# Patient Record
Sex: FEMALE | Race: OTHER | NOT HISPANIC OR LATINO
[De-identification: names, ages, dates, MRNs, and addresses within clinical notes are randomized per-mention and may not be internally consistent; named-entity substitution may affect disease eponyms.]

---

## 2023-07-18 PROBLEM — Z00.00 ENCOUNTER FOR PREVENTIVE HEALTH EXAMINATION: Status: ACTIVE | Noted: 2023-07-18

## 2023-08-02 ENCOUNTER — APPOINTMENT (OUTPATIENT)
Dept: OBGYN | Facility: CLINIC | Age: 33
End: 2023-08-02
Payer: MEDICAID

## 2023-08-02 ENCOUNTER — TRANSCRIPTION ENCOUNTER (OUTPATIENT)
Age: 33
End: 2023-08-02

## 2023-08-02 VITALS
SYSTOLIC BLOOD PRESSURE: 104 MMHG | DIASTOLIC BLOOD PRESSURE: 74 MMHG | OXYGEN SATURATION: 100 % | WEIGHT: 170 LBS | TEMPERATURE: 98.2 F | HEIGHT: 65 IN | HEART RATE: 65 BPM | RESPIRATION RATE: 16 BRPM | BODY MASS INDEX: 28.32 KG/M2

## 2023-08-02 DIAGNOSIS — Z01.419 ENCOUNTER FOR GYNECOLOGICAL EXAMINATION (GENERAL) (ROUTINE) W/OUT ABNORMAL FINDINGS: ICD-10-CM

## 2023-08-02 DIAGNOSIS — N92.6 IRREGULAR MENSTRUATION, UNSPECIFIED: ICD-10-CM

## 2023-08-02 PROCEDURE — 99385 PREV VISIT NEW AGE 18-39: CPT

## 2023-08-02 PROCEDURE — 36415 COLL VENOUS BLD VENIPUNCTURE: CPT

## 2023-08-02 NOTE — HISTORY OF PRESENT ILLNESS
[FreeTextEntry1] : Juliet Hebert 31 YO, presents for Annual G 0    LMP: 07/19/2023 - Irregular Menses Sexually active, with the same partner X 1 year No Medication Maternal aunt has history of breast Cancer.  To do monthly SBE.  Trying to conceive without timing it.  Partner has a daughter with another women.  Has gained over 20 lbs. in last 1 year.  No health issues, nonsmoker.  To f/u after pelvic sonogram.

## 2023-08-03 LAB
C TRACH RRNA SPEC QL NAA+PROBE: NOT DETECTED
DHEA-S SERPL-MCNC: 338 UG/DL
ESTIMATED AVERAGE GLUCOSE: 114 MG/DL
ESTRADIOL SERPL-MCNC: 79 PG/ML
FSH SERPL-MCNC: 5.4 IU/L
HBA1C MFR BLD HPLC: 5.6 %
HCG SERPL-MCNC: <1 MIU/ML
LH SERPL-ACNC: 11.9 IU/L
N GONORRHOEA RRNA SPEC QL NAA+PROBE: NOT DETECTED
PROLACTIN SERPL-MCNC: 10.5 NG/ML
SOURCE TP AMPLIFICATION: NORMAL
TESTOST SERPL-MCNC: 57.5 NG/DL

## 2023-08-05 LAB — CYTOLOGY CVX/VAG DOC THIN PREP: NORMAL

## 2023-09-21 ENCOUNTER — APPOINTMENT (OUTPATIENT)
Dept: OBGYN | Facility: CLINIC | Age: 33
End: 2023-09-21
Payer: MEDICAID

## 2023-09-21 VITALS
SYSTOLIC BLOOD PRESSURE: 105 MMHG | WEIGHT: 170 LBS | RESPIRATION RATE: 16 BRPM | OXYGEN SATURATION: 97 % | TEMPERATURE: 98 F | DIASTOLIC BLOOD PRESSURE: 74 MMHG | HEART RATE: 64 BPM | BODY MASS INDEX: 28.32 KG/M2 | HEIGHT: 65 IN

## 2023-09-21 DIAGNOSIS — E28.2 POLYCYSTIC OVARIAN SYNDROME: ICD-10-CM

## 2023-09-21 PROCEDURE — 99213 OFFICE O/P EST LOW 20 MIN: CPT

## 2024-06-01 ENCOUNTER — INPATIENT (INPATIENT)
Facility: HOSPITAL | Age: 34
LOS: 0 days | Discharge: HOME | End: 2024-06-02
Attending: STUDENT IN AN ORGANIZED HEALTH CARE EDUCATION/TRAINING PROGRAM | Admitting: STUDENT IN AN ORGANIZED HEALTH CARE EDUCATION/TRAINING PROGRAM
Payer: COMMERCIAL

## 2024-06-01 VITALS
OXYGEN SATURATION: 99 % | HEIGHT: 64 IN | DIASTOLIC BLOOD PRESSURE: 66 MMHG | RESPIRATION RATE: 18 BRPM | HEART RATE: 132 BPM | WEIGHT: 166.89 LBS | SYSTOLIC BLOOD PRESSURE: 11 MMHG | TEMPERATURE: 99 F

## 2024-06-01 DIAGNOSIS — D72.825 BANDEMIA: ICD-10-CM

## 2024-06-01 DIAGNOSIS — Z29.9 ENCOUNTER FOR PROPHYLACTIC MEASURES, UNSPECIFIED: ICD-10-CM

## 2024-06-01 DIAGNOSIS — J02.9 ACUTE PHARYNGITIS, UNSPECIFIED: ICD-10-CM

## 2024-06-01 LAB
ALBUMIN SERPL ELPH-MCNC: 4 G/DL — SIGNIFICANT CHANGE UP (ref 3.3–5)
ALP SERPL-CCNC: 94 U/L — SIGNIFICANT CHANGE UP (ref 40–120)
ALT FLD-CCNC: 40 U/L — HIGH (ref 4–33)
ANION GAP SERPL CALC-SCNC: 13 MMOL/L — SIGNIFICANT CHANGE UP (ref 7–14)
AST SERPL-CCNC: 41 U/L — HIGH (ref 4–32)
BASE EXCESS BLDV CALC-SCNC: -1.5 MMOL/L — SIGNIFICANT CHANGE UP (ref -2–3)
BASOPHILS # BLD AUTO: 0 K/UL — SIGNIFICANT CHANGE UP (ref 0–0.2)
BASOPHILS NFR BLD AUTO: 0 % — SIGNIFICANT CHANGE UP (ref 0–2)
BILIRUB SERPL-MCNC: 0.6 MG/DL — SIGNIFICANT CHANGE UP (ref 0.2–1.2)
BLOOD GAS VENOUS COMPREHENSIVE RESULT: SIGNIFICANT CHANGE UP
BUN SERPL-MCNC: 10 MG/DL — SIGNIFICANT CHANGE UP (ref 7–23)
CALCIUM SERPL-MCNC: 8.6 MG/DL — SIGNIFICANT CHANGE UP (ref 8.4–10.5)
CHLORIDE BLDV-SCNC: 102 MMOL/L — SIGNIFICANT CHANGE UP (ref 96–108)
CHLORIDE SERPL-SCNC: 102 MMOL/L — SIGNIFICANT CHANGE UP (ref 98–107)
CO2 BLDV-SCNC: 25.7 MMOL/L — SIGNIFICANT CHANGE UP (ref 22–26)
CO2 SERPL-SCNC: 21 MMOL/L — LOW (ref 22–31)
CREAT SERPL-MCNC: 0.97 MG/DL — SIGNIFICANT CHANGE UP (ref 0.5–1.3)
EGFR: 80 ML/MIN/1.73M2 — SIGNIFICANT CHANGE UP
EOSINOPHIL # BLD AUTO: 0.17 K/UL — SIGNIFICANT CHANGE UP (ref 0–0.5)
EOSINOPHIL NFR BLD AUTO: 1.8 % — SIGNIFICANT CHANGE UP (ref 0–6)
FLUAV AG NPH QL: SIGNIFICANT CHANGE UP
FLUBV AG NPH QL: SIGNIFICANT CHANGE UP
GAS PNL BLDV: 133 MMOL/L — LOW (ref 136–145)
GAS PNL BLDV: SIGNIFICANT CHANGE UP
GLUCOSE BLDV-MCNC: 107 MG/DL — HIGH (ref 70–99)
GLUCOSE SERPL-MCNC: 110 MG/DL — HIGH (ref 70–99)
HCG SERPL-ACNC: <1 MIU/ML — SIGNIFICANT CHANGE UP
HCO3 BLDV-SCNC: 24 MMOL/L — SIGNIFICANT CHANGE UP (ref 22–29)
HCT VFR BLD CALC: 40.8 % — SIGNIFICANT CHANGE UP (ref 34.5–45)
HCT VFR BLDA CALC: 41 % — SIGNIFICANT CHANGE UP (ref 34.5–46.5)
HGB BLD CALC-MCNC: 13.6 G/DL — SIGNIFICANT CHANGE UP (ref 11.7–16.1)
HGB BLD-MCNC: 13.1 G/DL — SIGNIFICANT CHANGE UP (ref 11.5–15.5)
IANC: 7.56 K/UL — HIGH (ref 1.8–7.4)
LACTATE BLDV-MCNC: 1.3 MMOL/L — SIGNIFICANT CHANGE UP (ref 0.5–2)
LYMPHOCYTES # BLD AUTO: 0.33 K/UL — LOW (ref 1–3.3)
LYMPHOCYTES # BLD AUTO: 3.5 % — LOW (ref 13–44)
MANUAL SMEAR VERIFICATION: SIGNIFICANT CHANGE UP
MCHC RBC-ENTMCNC: 26.6 PG — LOW (ref 27–34)
MCHC RBC-ENTMCNC: 32.1 GM/DL — SIGNIFICANT CHANGE UP (ref 32–36)
MCV RBC AUTO: 82.9 FL — SIGNIFICANT CHANGE UP (ref 80–100)
METAMYELOCYTES # FLD: 4.4 % — HIGH (ref 0–1)
MONOCYTES # BLD AUTO: 0.91 K/UL — HIGH (ref 0–0.9)
MONOCYTES NFR BLD AUTO: 9.7 % — SIGNIFICANT CHANGE UP (ref 2–14)
NEUTROPHILS # BLD AUTO: 7.35 K/UL — SIGNIFICANT CHANGE UP (ref 1.8–7.4)
NEUTROPHILS NFR BLD AUTO: 38.1 % — LOW (ref 43–77)
NEUTS BAND # BLD: 39.8 % — CRITICAL HIGH (ref 0–6)
PCO2 BLDV: 44 MMHG — SIGNIFICANT CHANGE UP (ref 39–52)
PH BLDV: 7.35 — SIGNIFICANT CHANGE UP (ref 7.32–7.43)
PLAT MORPH BLD: ABNORMAL
PLATELET # BLD AUTO: 300 K/UL — SIGNIFICANT CHANGE UP (ref 150–400)
PLATELET COUNT - ESTIMATE: NORMAL — SIGNIFICANT CHANGE UP
PO2 BLDV: 24 MMHG — LOW (ref 25–45)
POLYCHROMASIA BLD QL SMEAR: SIGNIFICANT CHANGE UP
POTASSIUM BLDV-SCNC: 3.9 MMOL/L — SIGNIFICANT CHANGE UP (ref 3.5–5.1)
POTASSIUM SERPL-MCNC: 3.8 MMOL/L — SIGNIFICANT CHANGE UP (ref 3.5–5.3)
POTASSIUM SERPL-SCNC: 3.8 MMOL/L — SIGNIFICANT CHANGE UP (ref 3.5–5.3)
PROT SERPL-MCNC: 7.6 G/DL — SIGNIFICANT CHANGE UP (ref 6–8.3)
RBC # BLD: 4.92 M/UL — SIGNIFICANT CHANGE UP (ref 3.8–5.2)
RBC # FLD: 13 % — SIGNIFICANT CHANGE UP (ref 10.3–14.5)
RBC BLD AUTO: ABNORMAL
RSV RNA NPH QL NAA+NON-PROBE: SIGNIFICANT CHANGE UP
S PYO DNA THROAT QL NAA+PROBE: ABNORMAL
SAO2 % BLDV: 40 % — LOW (ref 67–88)
SARS-COV-2 RNA SPEC QL NAA+PROBE: SIGNIFICANT CHANGE UP
SODIUM SERPL-SCNC: 136 MMOL/L — SIGNIFICANT CHANGE UP (ref 135–145)
VARIANT LYMPHS # BLD: 2.7 % — SIGNIFICANT CHANGE UP (ref 0–6)
WBC # BLD: 9.43 K/UL — SIGNIFICANT CHANGE UP (ref 3.8–10.5)
WBC # FLD AUTO: 9.43 K/UL — SIGNIFICANT CHANGE UP (ref 3.8–10.5)

## 2024-06-01 PROCEDURE — 99222 1ST HOSP IP/OBS MODERATE 55: CPT

## 2024-06-01 PROCEDURE — 71046 X-RAY EXAM CHEST 2 VIEWS: CPT | Mod: 26

## 2024-06-01 PROCEDURE — 70491 CT SOFT TISSUE NECK W/DYE: CPT | Mod: 26,MC

## 2024-06-01 PROCEDURE — 99285 EMERGENCY DEPT VISIT HI MDM: CPT

## 2024-06-01 RX ORDER — BENZOCAINE AND MENTHOL 5; 1 G/100ML; G/100ML
1 LIQUID ORAL
Refills: 0 | Status: DISCONTINUED | OUTPATIENT
Start: 2024-06-01 | End: 2024-06-02

## 2024-06-01 RX ORDER — ONDANSETRON 8 MG/1
4 TABLET, FILM COATED ORAL EVERY 8 HOURS
Refills: 0 | Status: DISCONTINUED | OUTPATIENT
Start: 2024-06-01 | End: 2024-06-02

## 2024-06-01 RX ORDER — ASCORBIC ACID 60 MG
1 TABLET,CHEWABLE ORAL
Refills: 0 | DISCHARGE

## 2024-06-01 RX ORDER — DEXAMETHASONE 0.5 MG/5ML
10 ELIXIR ORAL ONCE
Refills: 0 | Status: COMPLETED | OUTPATIENT
Start: 2024-06-01 | End: 2024-06-01

## 2024-06-01 RX ORDER — PENICILLIN V POTASIUM 500 MG/1
500 TABLET OROPHARYNGEAL EVERY 8 HOURS
Refills: 0 | Status: DISCONTINUED | OUTPATIENT
Start: 2024-06-01 | End: 2024-06-02

## 2024-06-01 RX ORDER — SODIUM CHLORIDE 9 MG/ML
1000 INJECTION INTRAMUSCULAR; INTRAVENOUS; SUBCUTANEOUS ONCE
Refills: 0 | Status: COMPLETED | OUTPATIENT
Start: 2024-06-01 | End: 2024-06-01

## 2024-06-01 RX ORDER — KETOROLAC TROMETHAMINE 30 MG/ML
15 SYRINGE (ML) INJECTION ONCE
Refills: 0 | Status: DISCONTINUED | OUTPATIENT
Start: 2024-06-01 | End: 2024-06-01

## 2024-06-01 RX ORDER — ACETAMINOPHEN 500 MG
650 TABLET ORAL EVERY 6 HOURS
Refills: 0 | Status: DISCONTINUED | OUTPATIENT
Start: 2024-06-01 | End: 2024-06-02

## 2024-06-01 RX ORDER — PIPERACILLIN AND TAZOBACTAM 4; .5 G/20ML; G/20ML
3.38 INJECTION, POWDER, LYOPHILIZED, FOR SOLUTION INTRAVENOUS ONCE
Refills: 0 | Status: COMPLETED | OUTPATIENT
Start: 2024-06-01 | End: 2024-06-01

## 2024-06-01 RX ORDER — LANOLIN ALCOHOL/MO/W.PET/CERES
3 CREAM (GRAM) TOPICAL AT BEDTIME
Refills: 0 | Status: DISCONTINUED | OUTPATIENT
Start: 2024-06-01 | End: 2024-06-02

## 2024-06-01 RX ORDER — KETOROLAC TROMETHAMINE 30 MG/ML
15 SYRINGE (ML) INJECTION EVERY 6 HOURS
Refills: 0 | Status: DISCONTINUED | OUTPATIENT
Start: 2024-06-01 | End: 2024-06-02

## 2024-06-01 RX ADMIN — PENICILLIN V POTASIUM 500 MILLIGRAM(S): 500 TABLET OROPHARYNGEAL at 23:18

## 2024-06-01 RX ADMIN — Medication 15 MILLIGRAM(S): at 09:26

## 2024-06-01 RX ADMIN — Medication 10 MILLIGRAM(S): at 12:24

## 2024-06-01 RX ADMIN — Medication 15 MILLIGRAM(S): at 12:23

## 2024-06-01 RX ADMIN — Medication 15 MILLIGRAM(S): at 23:18

## 2024-06-01 RX ADMIN — SODIUM CHLORIDE 1000 MILLILITER(S): 9 INJECTION INTRAMUSCULAR; INTRAVENOUS; SUBCUTANEOUS at 09:26

## 2024-06-01 RX ADMIN — Medication 15 MILLIGRAM(S): at 18:42

## 2024-06-01 RX ADMIN — SODIUM CHLORIDE 1000 MILLILITER(S): 9 INJECTION INTRAMUSCULAR; INTRAVENOUS; SUBCUTANEOUS at 12:23

## 2024-06-01 RX ADMIN — SODIUM CHLORIDE 1000 MILLILITER(S): 9 INJECTION INTRAMUSCULAR; INTRAVENOUS; SUBCUTANEOUS at 11:58

## 2024-06-01 RX ADMIN — Medication 15 MILLIGRAM(S): at 18:29

## 2024-06-01 RX ADMIN — SODIUM CHLORIDE 1000 MILLILITER(S): 9 INJECTION INTRAMUSCULAR; INTRAVENOUS; SUBCUTANEOUS at 12:58

## 2024-06-01 RX ADMIN — PIPERACILLIN AND TAZOBACTAM 200 GRAM(S): 4; .5 INJECTION, POWDER, LYOPHILIZED, FOR SOLUTION INTRAVENOUS at 12:21

## 2024-06-01 RX ADMIN — Medication 102 MILLIGRAM(S): at 09:48

## 2024-06-01 RX ADMIN — Medication 1 TABLET(S): at 11:55

## 2024-06-01 NOTE — H&P ADULT - PROBLEM SELECTOR PLAN 1
Sepsis present on admission (HR >100 w/ bandemia on 39%), reports subjective fever at home.  CT neck with pharyngitis, most notable at the left palatine tonsil but without a rim-enhancing or organized collection to indicate abscess. Reactive jugular chain lymphadenopathy, also without abscess at this time.  - check COVID swab  - Centor score 3, strep pcr in lab  - s/p Augmentin and zosyn, will c/w pcn PO  - s/p decadron 10 mg in ED, s/p toradol, will c/w IV toradol for pain  - f/u blood cultures  - c/w supportive care, monitor PO tolerance and neck/airway--currently appears comfortable Sepsis present on admission (HR >100 w/ bandemia on 39%), reports subjective fever at home.  CT neck with pharyngitis, most notable at the left palatine tonsil but without a rim-enhancing or organized collection to indicate abscess. Reactive jugular chain lymphadenopathy, also without abscess at this time.  - check COVID swab  - declined STI testing   - Centor score 3, strep pcr in lab  - s/p Augmentin and zosyn, will c/w pcn PO to cover GAS for now  - s/p decadron 10 mg in ED, s/p Toradol, will c/w IV Toradol for pain  - f/u blood cultures  - c/w supportive care, monitor PO tolerance and neck/airway--currently appears comfortable  - soft diet, advance as tolerared

## 2024-06-01 NOTE — H&P ADULT - ASSESSMENT
33F no medical history who presents with worsening sore throat and inability to swallow admitted for sepsis due to bacterial pharyngitis and airway monitoring.

## 2024-06-01 NOTE — PATIENT PROFILE ADULT - FALL HARM RISK - UNIVERSAL INTERVENTIONS
Bed in lowest position, wheels locked, appropriate side rails in place/Call bell, personal items and telephone in reach/Instruct patient to call for assistance before getting out of bed or chair/Non-slip footwear when patient is out of bed/[place] to call system/Physically safe environment - no spills, clutter or unnecessary equipment/Purposeful Proactive Rounding/Room/bathroom lighting operational, light cord in reach

## 2024-06-01 NOTE — ED PROVIDER NOTE - PHYSICAL EXAMINATION
GENERAL: Awake, alert, NAD  HEENT: Muffled voice, significant erythema and edema to posterior pharynx, left tonsil with white exudates, patient spitting up secretions in setting of pain.  LUNGS: CTAB, no wheezes or crackles   CARDIAC: RRR, no m/r/g  ABDOMEN: Soft, non tender  EXT: No edema, no calf tenderness, no deformities.  NEURO: A&Ox3. Moving all extremities.  SKIN: Warm and dry. No rash.  PSYCH: Normal affect.

## 2024-06-01 NOTE — H&P ADULT - NSHPLABSRESULTS_GEN_ALL_CORE
LABS:                        13.1   9.43  )-----------( 300      ( 01 Jun 2024 09:30 )             40.8     06-01    136  |  102  |  10  ----------------------------<  110<H>  3.8   |  21<L>  |  0.97    Ca    8.6      01 Jun 2024 09:30    TPro  7.6  /  Alb  4.0  /  TBili  0.6  /  DBili  x   /  AST  41<H>  /  ALT  40<H>  /  AlkPhos  94  06-01      Urinalysis Basic - ( 01 Jun 2024 09:30 )  Color: x / Appearance: x / SG: x / pH: x  Gluc: 110 mg/dL / Ketone: x  / Bili: x / Urobili: x   Blood: x / Protein: x / Nitrite: x   Leuk Esterase: x / RBC: x / WBC x   Sq Epi: x / Non Sq Epi: x / Bacteria: x      VBG 06-01 @ 09:30  pH: 7.35/pCO2: 44 /pO2: 24/HCO3: 24/lactate: 1.3

## 2024-06-01 NOTE — ED ADULT TRIAGE NOTE - CHIEF COMPLAINT QUOTE
Patient complains of flu-like symptoms for the past two days. Patient complains of sore throat, fever, body aches and chills. Patient tachycardic in triage, denies any CP or SOB. Patient respirations equal and unlabored on room air. No pertinent medical history.

## 2024-06-01 NOTE — H&P ADULT - NSHPPHYSICALEXAM_GEN_ALL_CORE
T(C): 36.8 (06-01-24 @ 13:14), Max: 37.4 (06-01-24 @ 08:40)  HR: 105 (06-01-24 @ 13:14) (105 - 132)  BP: 107/63 (06-01-24 @ 13:14) (11/66 - 109/78)  RR: 16 (06-01-24 @ 13:14) (14 - 18)  SpO2: 100% (06-01-24 @ 13:14) (99% - 100%)    PHYSICAL EXAM:  GENERAL: NAD, well-developed, nontoxic, muffled voice, no pooling secretions, normal WOB, able to swallow water   HEAD:  Atraumatic, Normocephalic  EYES: EOMI, PERRLA, conjunctiva and sclera clear  NECK: fullness on L >R w/ LAD  CHEST/LUNG: Clear to auscultation bilaterally; no wheeze  HEART: Regular rate and rhythm; no murmurs, rubs, or gallops  ABDOMEN: Soft, Nontender, Nondistended; Bowel sounds present  EXTREMITIES:  warm and well perfused, no clubbing, cyanosis, or edema  PSYCH: AAOx3  NEUROLOGY: non-focal  SKIN: no rashes or lesions

## 2024-06-01 NOTE — H&P ADULT - NSICDXFAMILYHX_GEN_ALL_CORE_FT
FAMILY HISTORY:  Father  Still living? Unknown  Family history of CKD (chronic kidney disease), Age at diagnosis: Age Unknown  FH: HTN (hypertension), Age at diagnosis: Age Unknown    Mother  Still living? Unknown  FHx: diabetes mellitus, Age at diagnosis: Age Unknown    Aunt  Still living? Unknown  Family history of breast cancer, Age at diagnosis: Age Unknown

## 2024-06-01 NOTE — ED PROVIDER NOTE - CARE PLAN
Principal Discharge DX:	Sore throat   1 Principal Discharge DX:	Bandemia without diagnosis of specific infection

## 2024-06-01 NOTE — ED PROVIDER NOTE - CLINICAL SUMMARY MEDICAL DECISION MAKING FREE TEXT BOX
33-year-old female patient with no prior medical history who presents to the emergency department for 2 to 3 days of worsening throat pain, fevers, poor appetite.  Patient endorses she was taking Advil for her throat pain with minimal relief.  Throat pain worsened overnight and is now associated with difficulty swallowing saliva/secretions and eating.  Patient denies any recent travel, sick contacts, nausea, vomiting, chest pain, shortness of breath or any other complaint such as cough.  On exam, found with significant posterior pharynx erythema and edema.  Left tonsil found with white exudates.  Will evaluate for PTA versus strep throat pharyngitis versus viral pharyngitis versus posterior pharynx cellulitis.  Will draw labs and evaluate with CT of the neck for abscess.  Will give Toradol, fluids, and Decadron.  Will reevaluate the patient and dispo appropriately once workup has resulted. 33-year-old female patient with no prior medical history who presents to the emergency department for 2 to 3 days of worsening throat pain, fevers, poor appetite.  Patient endorses she was taking Advil for her throat pain with minimal relief.  Throat pain worsened overnight and is now associated with difficulty swallowing saliva/secretions and eating.  Patient denies any recent travel, sick contacts, nausea, vomiting, chest pain, shortness of breath or any other complaint such as cough.  On exam, found with significant posterior pharynx erythema and edema.  Left tonsil found with white exudates.  Will evaluate for PTA versus strep throat pharyngitis versus viral pharyngitis versus posterior pharynx cellulitis.  Will draw labs and evaluate with CT of the neck for abscess.  Will give Toradol, fluids, and Decadron.  Will reevaluate the patient and dispo appropriately once workup has resulted.    Fabricio, Attending  See Attestation

## 2024-06-01 NOTE — ED ADULT NURSE NOTE - NSFALLUNIVINTERV_ED_ALL_ED
Bed/Stretcher in lowest position, wheels locked, appropriate side rails in place/Call bell, personal items and telephone in reach/Instruct patient to call for assistance before getting out of bed/chair/stretcher/Non-slip footwear applied when patient is off stretcher/[place] to call system/Physically safe environment - no spills, clutter or unnecessary equipment/Purposeful proactive rounding/Room/bathroom lighting operational, light cord in reach

## 2024-06-01 NOTE — ED PROVIDER NOTE - PROGRESS NOTE DETAILS
Traci, PGY3: Patient found with elevated bands. Patient's tachycardia improved to 110s. Will give another IVF bolus and will administer Zosyn. Patient made aware of results and plan to admit. Patient understands and all questions have been answered. Patient's pain is under control at this time.

## 2024-06-01 NOTE — H&P ADULT - HISTORY OF PRESENT ILLNESS
33F no medical history who presents with worsening sore throat and unable to swallow.  atient reports that pain started Friday 5/31 am, woke up that way, pretty severe, attempted to go to work but pain was too much, was able to drink a bit but not eat.  Reports pain is in middle of throat, perhaps the L side of neck also felt rose and tender.  Patient reports subjective fever at home.  No cough, no ear pain, no runny nose, no sick contacts, no travel, in a monomagous relationship. No prior strep throat or frequent infxn, no allergies to abx.  Pain a bit better since ED treatment.  33F no medical history who presents with worsening sore throat and unable to swallow.  atient reports that pain started Friday 5/31 am, woke up that way, pretty severe, attempted to go to work but pain was too much, was able to drink a bit but not eat.  Reports pain is in middle of throat, perhaps the L side of neck also felt rose and tender.  Patient reports subjective fever at home.  Reports had body aches all over on 5/31 that have since resolved. No cough, no ear pain, no runny nose, no sick contacts, no travel, in a monogamous relationship. No prior strep throat or frequent infxn, no allergies to abx.  Pain a bit better since ED treatment.

## 2024-06-01 NOTE — ED ADULT NURSE NOTE - OBJECTIVE STATEMENT
32 y/o F arrives to E.D. rm 9 c/o flu-like sxs x2 days. Denies PMHx. Pt is a&ox4, ambulatory, neg SOB, denies CP, neg N/V/D, + chills. Difficulty tolerating secretions d/t pain. Airway patent. L 20g IV placed to AC. ST to 130s on tele. Frequent monitoring in place.

## 2024-06-02 ENCOUNTER — TRANSCRIPTION ENCOUNTER (OUTPATIENT)
Age: 34
End: 2024-06-02

## 2024-06-02 VITALS
OXYGEN SATURATION: 100 % | DIASTOLIC BLOOD PRESSURE: 69 MMHG | TEMPERATURE: 98 F | SYSTOLIC BLOOD PRESSURE: 104 MMHG | HEART RATE: 98 BPM | RESPIRATION RATE: 18 BRPM

## 2024-06-02 LAB
ALBUMIN SERPL ELPH-MCNC: 3.5 G/DL — SIGNIFICANT CHANGE UP (ref 3.3–5)
ALP SERPL-CCNC: 87 U/L — SIGNIFICANT CHANGE UP (ref 40–120)
ALT FLD-CCNC: 35 U/L — HIGH (ref 4–33)
ANION GAP SERPL CALC-SCNC: 12 MMOL/L — SIGNIFICANT CHANGE UP (ref 7–14)
AST SERPL-CCNC: 25 U/L — SIGNIFICANT CHANGE UP (ref 4–32)
BASOPHILS # BLD AUTO: 0.03 K/UL — SIGNIFICANT CHANGE UP (ref 0–0.2)
BASOPHILS NFR BLD AUTO: 0.2 % — SIGNIFICANT CHANGE UP (ref 0–2)
BILIRUB DIRECT SERPL-MCNC: <0.2 MG/DL — SIGNIFICANT CHANGE UP (ref 0–0.3)
BILIRUB INDIRECT FLD-MCNC: SIGNIFICANT CHANGE UP MG/DL (ref 0–1)
BILIRUB SERPL-MCNC: 0.3 MG/DL — SIGNIFICANT CHANGE UP (ref 0.2–1.2)
BUN SERPL-MCNC: 12 MG/DL — SIGNIFICANT CHANGE UP (ref 7–23)
CALCIUM SERPL-MCNC: 8.7 MG/DL — SIGNIFICANT CHANGE UP (ref 8.4–10.5)
CHLORIDE SERPL-SCNC: 108 MMOL/L — HIGH (ref 98–107)
CO2 SERPL-SCNC: 20 MMOL/L — LOW (ref 22–31)
CREAT SERPL-MCNC: 0.78 MG/DL — SIGNIFICANT CHANGE UP (ref 0.5–1.3)
EGFR: 103 ML/MIN/1.73M2 — SIGNIFICANT CHANGE UP
EOSINOPHIL # BLD AUTO: 0.01 K/UL — SIGNIFICANT CHANGE UP (ref 0–0.5)
EOSINOPHIL NFR BLD AUTO: 0.1 % — SIGNIFICANT CHANGE UP (ref 0–6)
GLUCOSE SERPL-MCNC: 112 MG/DL — HIGH (ref 70–99)
HCT VFR BLD CALC: 34.9 % — SIGNIFICANT CHANGE UP (ref 34.5–45)
HGB BLD-MCNC: 11.8 G/DL — SIGNIFICANT CHANGE UP (ref 11.5–15.5)
IANC: 12.11 K/UL — HIGH (ref 1.8–7.4)
IMM GRANULOCYTES NFR BLD AUTO: 0.6 % — SIGNIFICANT CHANGE UP (ref 0–0.9)
LYMPHOCYTES # BLD AUTO: 1.48 K/UL — SIGNIFICANT CHANGE UP (ref 1–3.3)
LYMPHOCYTES # BLD AUTO: 10.3 % — LOW (ref 13–44)
MCHC RBC-ENTMCNC: 27.5 PG — SIGNIFICANT CHANGE UP (ref 27–34)
MCHC RBC-ENTMCNC: 33.8 GM/DL — SIGNIFICANT CHANGE UP (ref 32–36)
MCV RBC AUTO: 81.4 FL — SIGNIFICANT CHANGE UP (ref 80–100)
MONOCYTES # BLD AUTO: 0.67 K/UL — SIGNIFICANT CHANGE UP (ref 0–0.9)
MONOCYTES NFR BLD AUTO: 4.7 % — SIGNIFICANT CHANGE UP (ref 2–14)
NEUTROPHILS # BLD AUTO: 12.11 K/UL — HIGH (ref 1.8–7.4)
NEUTROPHILS NFR BLD AUTO: 84.1 % — HIGH (ref 43–77)
NRBC # BLD: 0 /100 WBCS — SIGNIFICANT CHANGE UP (ref 0–0)
NRBC # FLD: 0 K/UL — SIGNIFICANT CHANGE UP (ref 0–0)
PLATELET # BLD AUTO: 317 K/UL — SIGNIFICANT CHANGE UP (ref 150–400)
POTASSIUM SERPL-MCNC: 3.9 MMOL/L — SIGNIFICANT CHANGE UP (ref 3.5–5.3)
POTASSIUM SERPL-SCNC: 3.9 MMOL/L — SIGNIFICANT CHANGE UP (ref 3.5–5.3)
PROT SERPL-MCNC: 6.8 G/DL — SIGNIFICANT CHANGE UP (ref 6–8.3)
RBC # BLD: 4.29 M/UL — SIGNIFICANT CHANGE UP (ref 3.8–5.2)
RBC # FLD: 12.8 % — SIGNIFICANT CHANGE UP (ref 10.3–14.5)
SODIUM SERPL-SCNC: 140 MMOL/L — SIGNIFICANT CHANGE UP (ref 135–145)
WBC # BLD: 14.39 K/UL — HIGH (ref 3.8–10.5)
WBC # FLD AUTO: 14.39 K/UL — HIGH (ref 3.8–10.5)

## 2024-06-02 RX ORDER — AMOXICILLIN 250 MG/5ML
500 SUSPENSION, RECONSTITUTED, ORAL (ML) ORAL EVERY 8 HOURS
Refills: 0 | Status: DISCONTINUED | OUTPATIENT
Start: 2024-06-02 | End: 2024-06-02

## 2024-06-02 RX ORDER — KETOROLAC TROMETHAMINE 30 MG/ML
10 SYRINGE (ML) INJECTION EVERY 6 HOURS
Refills: 0 | Status: DISCONTINUED | OUTPATIENT
Start: 2024-06-02 | End: 2024-06-02

## 2024-06-02 RX ORDER — AMOXICILLIN 250 MG/5ML
1 SUSPENSION, RECONSTITUTED, ORAL (ML) ORAL
Qty: 21 | Refills: 0
Start: 2024-06-02 | End: 2024-06-08

## 2024-06-02 RX ORDER — KETOROLAC TROMETHAMINE 30 MG/ML
1 SYRINGE (ML) INJECTION
Qty: 12 | Refills: 0
Start: 2024-06-02 | End: 2024-06-04

## 2024-06-02 RX ORDER — AMOXICILLIN 250 MG/5ML
1 SUSPENSION, RECONSTITUTED, ORAL (ML) ORAL
Qty: 0 | Refills: 0 | DISCHARGE
Start: 2024-06-02

## 2024-06-02 RX ORDER — ACETAMINOPHEN 500 MG
2 TABLET ORAL
Qty: 0 | Refills: 0 | DISCHARGE
Start: 2024-06-02

## 2024-06-02 RX ORDER — KETOROLAC TROMETHAMINE 30 MG/ML
1 SYRINGE (ML) INJECTION
Qty: 0 | Refills: 0 | DISCHARGE
Start: 2024-06-02

## 2024-06-02 RX ADMIN — Medication 15 MILLIGRAM(S): at 06:43

## 2024-06-02 RX ADMIN — Medication 10 MILLIGRAM(S): at 14:48

## 2024-06-02 RX ADMIN — Medication 500 MILLIGRAM(S): at 16:48

## 2024-06-02 RX ADMIN — Medication 10 MILLIGRAM(S): at 15:20

## 2024-06-02 RX ADMIN — PENICILLIN V POTASIUM 500 MILLIGRAM(S): 500 TABLET OROPHARYNGEAL at 06:42

## 2024-06-02 NOTE — DISCHARGE NOTE PROVIDER - CARE PROVIDER_API CALL
Gaby Grider  Internal Medicine  [address] [place] [address]  Phone: [phone]  Fax: [phone]  Follow Up Time:

## 2024-06-02 NOTE — PROGRESS NOTE ADULT - SUBJECTIVE AND OBJECTIVE BOX
Patient is a 33y old  Female who presents with a chief complaint of Bandemia  patient not known to me, assigned this AM to assume care  chart reviewed and events thus far noted  admitted overnight by hospitalist colleague       DATE OF SERVICE: 06-02-24 @ 11:22    SUBJECTIVE / OVERNIGHT EVENTS: overnight events noted    ROS:  Resp: No cough no sputum production  CVS: No chest pain no palpitations no orthopnea  GI: no N/V/D  tolerating   "I want to go home"           MEDICATIONS  (STANDING):  amoxicillin 500 milliGRAM(s) Oral every 8 hours    MEDICATIONS  (PRN):  acetaminophen     Tablet .. 650 milliGRAM(s) Oral every 6 hours PRN Temp greater or equal to 38C (100.4F), Mild Pain (1 - 3)  aluminum hydroxide/magnesium hydroxide/simethicone Suspension 30 milliLiter(s) Oral every 4 hours PRN Dyspepsia  benzocaine/menthol Lozenge 1 Lozenge Oral every 3 hours PRN Sore Throat  ketorolac 10 milliGRAM(s) Oral every 6 hours PRN Moderate Pain (4 - 6)  melatonin 3 milliGRAM(s) Oral at bedtime PRN Insomnia  ondansetron Injectable 4 milliGRAM(s) IV Push every 8 hours PRN Nausea and/or Vomiting        CAPILLARY BLOOD GLUCOSE        I&O's Summary      Vital Signs Last 24 Hrs  T(C): 36.8 (02 Jun 2024 06:09), Max: 36.9 (01 Jun 2024 12:44)  T(F): 98.2 (02 Jun 2024 06:09), Max: 98.5 (01 Jun 2024 12:44)  HR: 89 (02 Jun 2024 06:09) (89 - 108)  BP: 101/72 (02 Jun 2024 06:09) (101/62 - 112/63)  BP(mean): --  RR: 18 (02 Jun 2024 06:09) (16 - 18)  SpO2: 99% (02 Jun 2024 06:09) (99% - 100%)    PHYSICAL EXAM:  GENERAL: in no apparent distress  HEAD:  Atraumatic, Normocephalic  EYES: EOMI, PERRLA, sclera clear  NECK: Supple, No JVD minimal fullness neck   CHEST/LUNG: clear b/l, no wheeze  HEART: S1 S2; no murmurs appreciated  ABDOMEN: Soft, Nontender, Bowel sounds present  EXTREMITIES:  No clubbing or cyanosis,  no edema  NEUROLOGY: AO x 3 non-focal  SKIN: No rashes or lesions    LABS:                        11.8   14.39 )-----------( 317      ( 02 Jun 2024 06:13 )             34.9     06-02    140  |  108<H>  |  12  ----------------------------<  112<H>  3.9   |  20<L>  |  0.78    Ca    8.7      02 Jun 2024 06:13    TPro  6.8  /  Alb  3.5  /  TBili  0.3  /  DBili  <0.2  /  AST  25  /  ALT  35<H>  /  AlkPhos  87  06-02          Urinalysis Basic - ( 02 Jun 2024 06:13 )    Color: x / Appearance: x / SG: x / pH: x  Gluc: 112 mg/dL / Ketone: x  / Bili: x / Urobili: x   Blood: x / Protein: x / Nitrite: x   Leuk Esterase: x / RBC: x / WBC x   Sq Epi: x / Non Sq Epi: x / Bacteria: x          All consultant(s) notes reviewed and care discussed with other providers        Contact Number, Dr Saldivar [phone]

## 2024-06-02 NOTE — DISCHARGE NOTE NURSING/CASE MANAGEMENT/SOCIAL WORK - PATIENT PORTAL LINK FT
You can access the FollowMyHealth Patient Portal offered by [facility] by registering at the following website: [email or web address]. By joining [facility]’s FollowMyHealth portal, you will also be able to view your health information using other applications (apps) compatible with our system.

## 2024-06-02 NOTE — DISCHARGE NOTE PROVIDER - NSDCCPCAREPLAN_GEN_ALL_CORE_FT
PRINCIPAL DISCHARGE DIAGNOSIS  Diagnosis: Pharyngitis  Assessment and Plan of Treatment: You were treated with antibiotics with improvement. Complete antibiotic therapy as directed.  Monitor for any further signs and symptoms of further infection, including but not limited to, fevers/chills, shortness of breath, worsening throat pain, sore throat, dysphagia, throat/neck swelling, increased heart rate, dizziness, or abrupt changes in mental status.

## 2024-06-02 NOTE — DISCHARGE NOTE PROVIDER - NSDCMRMEDTOKEN_GEN_ALL_CORE_FT
acetaminophen 325 mg oral tablet: 2 tab(s) orally every 6 hours As needed Temp greater or equal to 38C (100.4F), Mild Pain (1 - 3)  amoxicillin 500 mg oral capsule: 1 cap(s) orally every 8 hours  ascorbic acid 1000 mg oral tablet: 1 tab(s) orally once a day  ketorolac 10 mg oral tablet: 1 tab(s) orally every 6 hours As needed Moderate Pain (4 - 6)   acetaminophen 325 mg oral tablet: 2 tab(s) orally every 6 hours As needed Temp greater or equal to 38C (100.4F), Mild Pain (1 - 3)  amoxicillin 500 mg oral capsule: 1 cap(s) orally every 8 hours  ascorbic acid 1000 mg oral tablet: 1 tab(s) orally once a day  ketorolac 10 mg oral tablet: 1 tab(s) orally every 6 hours as needed for Moderate Pain (4 - 6)

## 2024-06-02 NOTE — DIETITIAN INITIAL EVALUATION ADULT - ORAL NUTRITION SUPPLEMENTS
Currently receiving ensure clear 3 x day providing 240kcal + 8g pro per serving. Will reduce to 2 x day because patient is not actively drinking 3 a day.

## 2024-06-02 NOTE — DIETITIAN INITIAL EVALUATION ADULT - ADD RECOMMEND
1) Defer to MD for diet consistency, continue with regular diet if promoted to regular consistency 1) Defer to MD for diet consistency, continue with regular diet if promoted to regular consistency  2) Encourage PO intake and honor food preferences as able.   3) Monitor PO intake, labs, weights, BMs, and skin integrity.

## 2024-06-02 NOTE — DISCHARGE NOTE PROVIDER - HOSPITAL COURSE
33 F no medical history who presents with worsening sore throat and unable to swallow. Reports that pain started Friday 5/31 AM, woke up that way, pretty severe, attempted to go to work but pain was too much, was able to drink a bit but not eat.  Reports pain is in middle of throat, perhaps the L side of neck also felt rose and tender.  Patient reports subjective fever at home.  Reports had body aches all over on 5/31 that have since resolved. No cough, no ear pain, no runny nose, no sick contacts, no travel, in a monogamous relationship. No prior strep throat or frequent infxn, no allergies to Abx. Admitted for pharyngitis. S/P Solumedrol in ED. GAS positive. S/P Penicillin, switched to Amoxicillin 500 mg q8. BCx negative to date. Pt monitored and clinically improved.     Spoke with attending, Dr. Saldivar, pt is medically stable for discharge to home

## 2024-06-02 NOTE — DIETITIAN INITIAL EVALUATION ADULT - PROBLEM SELECTOR PLAN 1
Sepsis present on admission (HR >100 w/ bandemia on 39%), reports subjective fever at home.  CT neck with pharyngitis, most notable at the left palatine tonsil but without a rim-enhancing or organized collection to indicate abscess. Reactive jugular chain lymphadenopathy, also without abscess at this time.  - check COVID swab  - declined STI testing   - Centor score 3, strep pcr in lab  - s/p Augmentin and zosyn, will c/w pcn PO to cover GAS for now  - s/p decadron 10 mg in ED, s/p Toradol, will c/w IV Toradol for pain  - f/u blood cultures  - c/w supportive care, monitor PO tolerance and neck/airway--currently appears comfortable  - soft diet, advance as tolerared

## 2024-06-02 NOTE — DISCHARGE NOTE NURSING/CASE MANAGEMENT/SOCIAL WORK - NSDCPEFALRISK_GEN_ALL_CORE
For information on Fall & Injury Prevention, visit: [email or web address] OR  [email or web address] OR  https://www.cdc.gov/steadi/patient.html

## 2024-06-02 NOTE — DIETITIAN INITIAL EVALUATION ADULT - PERTINENT MEDS FT
MEDICATIONS  (STANDING):  ketorolac   Injectable 15 milliGRAM(s) IV Push every 6 hours  penicillin   milliGRAM(s) Oral every 8 hours    MEDICATIONS  (PRN):  acetaminophen     Tablet .. 650 milliGRAM(s) Oral every 6 hours PRN Temp greater or equal to 38C (100.4F), Mild Pain (1 - 3)  aluminum hydroxide/magnesium hydroxide/simethicone Suspension 30 milliLiter(s) Oral every 4 hours PRN Dyspepsia  benzocaine/menthol Lozenge 1 Lozenge Oral every 3 hours PRN Sore Throat  melatonin 3 milliGRAM(s) Oral at bedtime PRN Insomnia  ondansetron Injectable 4 milliGRAM(s) IV Push every 8 hours PRN Nausea and/or Vomiting

## 2024-06-02 NOTE — DIETITIAN INITIAL EVALUATION ADULT - ORAL INTAKE PTA/DIET HISTORY
Spoke to pt at bedside. Pt does not have any food allergies or food intolerances. Takes no protein supplements PTA, takes vitamin C PTA. Intake was normal on Thursday 5/30, then was unable to eat on 5/31 due to throat pain. Did not experience any abnormal GI issues PTA.

## 2024-06-02 NOTE — DIETITIAN INITIAL EVALUATION ADULT - PERTINENT LABORATORY DATA
06-02    140  |  108<H>  |  12  ----------------------------<  112<H>  3.9   |  20<L>  |  0.78    Ca    8.7      02 Jun 2024 06:13    TPro  6.8  /  Alb  3.5  /  TBili  0.3  /  DBili  <0.2  /  AST  25  /  ALT  35<H>  /  AlkPhos  87  06-02

## 2024-06-02 NOTE — PROGRESS NOTE ADULT - PROBLEM SELECTOR PLAN 1
strept Group A positive   start amoxicillin 500 q 8  no further dexamethasone   clinically improving  if prelim blood cultures negative today can go on her own insistence  extremely anxious  for discharge   educated that if she has positive blood cultures she will need to return to Emergency Department

## 2024-06-06 LAB
CULTURE RESULTS: SIGNIFICANT CHANGE UP
CULTURE RESULTS: SIGNIFICANT CHANGE UP
SPECIMEN SOURCE: SIGNIFICANT CHANGE UP
SPECIMEN SOURCE: SIGNIFICANT CHANGE UP